# Patient Record
Sex: FEMALE | Race: WHITE
[De-identification: names, ages, dates, MRNs, and addresses within clinical notes are randomized per-mention and may not be internally consistent; named-entity substitution may affect disease eponyms.]

---

## 2022-03-25 ENCOUNTER — HOSPITAL ENCOUNTER (OUTPATIENT)
Dept: HOSPITAL 47 - RADMAMWWP | Age: 52
Discharge: HOME | End: 2022-03-25
Attending: OBSTETRICS & GYNECOLOGY
Payer: COMMERCIAL

## 2022-03-25 DIAGNOSIS — Z12.31: Primary | ICD-10-CM

## 2022-03-25 DIAGNOSIS — Z78.0: ICD-10-CM

## 2022-03-25 PROCEDURE — 77063 BREAST TOMOSYNTHESIS BI: CPT

## 2022-03-25 PROCEDURE — 77067 SCR MAMMO BI INCL CAD: CPT

## 2022-03-29 NOTE — MM
Reason for exam: screening  (asymptomatic).

Last mammogram was performed 7 years and 9 months ago.



History:

Patient is postmenopausal.

Excisional biopsy of the right breast, August 6, 2007.

Took hormonal contraceptives beginning at age 20.



Physical Findings:

A clinical breast exam by your physician is recommended on an annual basis and 

results should be correlated with mammographic findings.



MG 3D Screening Mammo W/Cad

Bilateral CC and MLO view(s) were taken.

Prior study comparison: June 16, 2014, bilateral MG diagnostic mammo w CAD [facility].

The breast tissue is heterogeneously dense. This may lower the sensitivity of 

mammography.  Grouped calcifications 2-3 o'clock left breast, likely early oil 

cyst formation. 6 month follow up recommended. Posterior upper outer quadrant 

grouped calcifications right breast, magnification view recommended for these new 

calcifications.





ASSESSMENT: Incomplete: need additional imaging evaluation, BI-RAD 0



RECOMMENDATION:

Special view mammogram of the right breast.



[facility] will attempt to contact patient to return for supplemental 

views.

Follow-up diagnostic mammogram of the left breast in 6 months.

## 2022-04-14 ENCOUNTER — HOSPITAL ENCOUNTER (OUTPATIENT)
Dept: HOSPITAL 47 - WWCWWP | Age: 52
End: 2022-04-14
Attending: SURGERY
Payer: COMMERCIAL

## 2022-04-14 ENCOUNTER — HOSPITAL ENCOUNTER (OUTPATIENT)
Dept: HOSPITAL 47 - RADMAMWWP | Age: 52
End: 2022-04-14
Attending: SURGERY
Payer: COMMERCIAL

## 2022-04-14 VITALS
DIASTOLIC BLOOD PRESSURE: 88 MMHG | TEMPERATURE: 98.1 F | HEART RATE: 77 BPM | SYSTOLIC BLOOD PRESSURE: 147 MMHG | RESPIRATION RATE: 16 BRPM

## 2022-04-14 DIAGNOSIS — R92.0: Primary | ICD-10-CM

## 2022-04-14 DIAGNOSIS — Z88.2: ICD-10-CM

## 2022-04-14 DIAGNOSIS — R92.8: Primary | ICD-10-CM

## 2022-04-14 NOTE — P.GSHP
History of Present Illness


H&P Date: 22


Chief Complaint: Abnormal right breast mammogram





     Taryn this 51-year-old white female seen in consultation for Dr. Hale 

regarding a mammographic abnormality in her right breast.  She underwent a 

bilateral mammogram in 2022 which revealed an area of concern in the right

breast, magnification views of that breast were performed and 4622 which 

revealed suspicious group/clustered calcifications in the upper quadrant of the 

right breast.  The patient does not feel anything of concern in either breast.  

She is not complaining of any new lumps masses or nodules of concern.  She has 

not had any recent trauma or infection in the breast. She had a cyst removed 

from her right breast in , which was benign.  Flat complaining of any pain 

in her breast.  Her last mammogram.  This last mammogram was in .





Caffiene: 1 cup/day


nicotine: none


chocolate: occasional


BCP: 10 years until 34


hormones: testosterone cream twice a week since 





Family history:


none





hormonal History:


menarche: 13


, breast fed: yes, age at first birth: 23


menopause: 47








Surgical history:


breast biopsy


Pilonidal cyst


Tubal ligation





Medical history:


lichen sclerosis/ seeing dermatologist soon in pubic area





Social history:


Nicotine: Negative


Alcohol: Negative


Drugs: Negative











- Constitutional


Constitutional: Reports sweats, Denies chills, Denies fever





- EENT


Eyes: denies blurred vision, denies pain


Ears: deny: decreased hearing, tinnitus


Ears, nose, mouth and throat: Denies headache, Denies sore throat





- Breasts


Breasts: bilateral: as per HPI





- Cardiovascular


Cardiovascular: Denies chest pain, Denies shortness of breath





- Respiratory


Respiratory: Denies cough, Denies 7





- Gastrointestinal


Gastrointestinal: Denies abdominal pain, Denies diarrhea, Denies nausea, Denies 

vomiting





- Genitourinary (Female)


Genitourinary: Denies dysuria, Denies hematuria





- Menstruation


Menstruation: Reports postmenopausal





- Musculoskeletal


Musculoskeletal: Denies myalgias





- Integumentary


Integumentary: Reports as per HPI





- Neurological


Neurological: Denies numbness, Denies weakness





- Psychiatric


Psychiatric: Denies anxiety, Denies depression





- Endocrine


Endocrine: Denies fatigue, Denies weight change





- Hematologic/Lymphatic


Comment: 





none





- Allergic/Immunologic


Allergic/Immunologic: Reports seasonal allergies





Medications and Allergies


                                Home Medications











 Medication  Instructions  Recorded  Confirmed  Type


 


Calcium Carbonate/Vitamin D3 1 tablet RC DAILY 22 History





[Calcium 600-Vit D3 12.5 Mcg (500    





Iu)]    


 


Testosterone [Testosterone 1.62% 1.25 gm TRANSDERM WEEKLY 22 

History





(1250 mg)]    








                                    Allergies











Allergy/AdvReac Type Severity Reaction Status Date / Time


 


sulfamethoxazole AdvReac  Rash/Hives Verified 22 07:14














Surgical - Exam





- General


no distress





- Eyes


normal ocular movement





- Neck


trachea midline





- Respiratory


normal respiratory effort





- Cardiovascular


Rhythm: regular


Heart Sounds: normal: S1





- Abdomen


Abdomen: soft





- Integumentary





lichen sclerosis pigment loss upper right chest





- Neurologic


no disoriented, no combative





- Musculoskeletal


normal gait, normal posture





- Psychiatric


oriented to time, oriented to person, oriented to place, speech is normal, m

timothy intact





Breast Exam:


BRA: 36B


inspection: Slightly irritated hair follicles under right axilla, bilateral 

grade 2 ptosis


Palpation:


Right breast: Well-healed scar from prior biopsy no dominant masses or nodules 

of concern on multiple positional exam fibrocystic changes


Right axilla: Slightly irritated hair follicles noted adenopathy of concern


Left axilla: No adenopathy of concern


Left breast: Multi-positional exam fibrocystic changes no dominant masses or 

nodules of concern





Results





Mammogram reviewed in detail with Dr. Turner





Assessment and Plan


Assessment: 





Impression:


Radiographic abnormality right breast microcalcifications upper outer quadrant





Plan:


The location of the microcalcifications on the ML view is questioned therefore a

mag ML view is recommended following this most likely stereotactic core biopsy





Risk and benefits of the procedure discussed with the patient.  She understands 

and wishes to proceed.  Risks include but are not limited to bleeding, 

infection, reaction to the anesthetic.  Additionally the risk that the area of 

concern is not adequately visualized which could result in aborting the 

procedure.  The patient understands and this will be done in the near future.  

Alternatives such as watchful waiting or removal in the operating room discussed

but not recommended.





CC: Dr. Hale

## 2022-04-14 NOTE — MM
EXAMINATION TYPE: MG discontinued stereo core RT

 

DATE OF EXAM: 4/14/2022

 

COMPARISON: 3/25/2022, 4/6/2022

 

CLINICAL HISTORY: 51-year-old female R92.8, abnormal mammogram

 

TECHNIQUE: Canceled stereotactic guided core biopsy of the right breast.  

 

FINDINGS:

The patient's initial images were reviewed. Given the far lateral position of the calcifications, a l
ateral approach was desired by the calcifications were not well depicted on the MLO and lateral views
. Because of this, repeat ML and mag ML views were obtained. The calcifications are identified at the
 9:00 position as discontinuous thin rim calcification suggesting early oil cyst. Six-month follow-up
 can be performed to confirm. Biopsy is being canceled. Findings and impression are discussed with yue elizabeth patient.

 

 

 

IMPRESSION: Canceled stereotactic core needle biopsy of the right breast. Additional views suggest ea
rly or cystic calcifications at the 9:00 position.

 

BI-RADS 3, probably benign

 

RECOMMENDATION:

1. Six-month follow-up diagnostic right breast mammogram.

2. Patient should continue monthly self breast exam.

3. This exam should not preclude additional follow-up of suspicious palpable abnormalities.

## 2022-05-23 ENCOUNTER — APPOINTMENT (OUTPATIENT)
Dept: URBAN - METROPOLITAN AREA CLINIC 234 | Age: 52
Setting detail: DERMATOLOGY
End: 2022-05-23

## 2022-05-23 VITALS — HEIGHT: 65 IN | WEIGHT: 145 LBS

## 2022-05-23 DIAGNOSIS — D22 MELANOCYTIC NEVI: ICD-10-CM

## 2022-05-23 DIAGNOSIS — L80 VITILIGO: ICD-10-CM

## 2022-05-23 PROBLEM — D22.0 MELANOCYTIC NEVI OF LIP: Status: ACTIVE | Noted: 2022-05-23

## 2022-05-23 PROCEDURE — OTHER MIPS QUALITY: OTHER

## 2022-05-23 PROCEDURE — OTHER PRESCRIPTION: OTHER

## 2022-05-23 PROCEDURE — 99203 OFFICE O/P NEW LOW 30 MIN: CPT

## 2022-05-23 PROCEDURE — OTHER COUNSELING: OTHER

## 2022-05-23 RX ORDER — TACROLIMUS 1 MG/G
OINTMENT TOPICAL
Qty: 60 | Refills: 3 | Status: ERX | COMMUNITY
Start: 2022-05-23

## 2022-05-23 ASSESSMENT — LOCATION ZONE DERM
LOCATION ZONE: LIP
LOCATION ZONE: TRUNK
LOCATION ZONE: ARM

## 2022-05-23 ASSESSMENT — LOCATION DETAILED DESCRIPTION DERM
LOCATION DETAILED: RIGHT LATERAL SUPERIOR CHEST
LOCATION DETAILED: RIGHT LOWER CUTANEOUS LIP
LOCATION DETAILED: RIGHT ANTERIOR SHOULDER
LOCATION DETAILED: RIGHT CLAVICULAR SKIN

## 2022-05-23 ASSESSMENT — LOCATION SIMPLE DESCRIPTION DERM
LOCATION SIMPLE: CHEST
LOCATION SIMPLE: RIGHT CLAVICULAR SKIN
LOCATION SIMPLE: RIGHT LIP
LOCATION SIMPLE: RIGHT SHOULDER

## 2022-05-23 NOTE — HPI: OTHER
Condition:: Change in skin pigment
Please Describe Your Condition:: is a new patient who is being seen for a chief complaint of change in skin pigment on chest

## 2022-10-19 ENCOUNTER — HOSPITAL ENCOUNTER (OUTPATIENT)
Dept: HOSPITAL 47 - RADMAMWWP | Age: 52
Discharge: HOME | End: 2022-10-19
Attending: SURGERY
Payer: COMMERCIAL

## 2022-10-19 DIAGNOSIS — Z78.0: ICD-10-CM

## 2022-10-19 DIAGNOSIS — R92.8: Primary | ICD-10-CM

## 2022-10-19 PROCEDURE — 77061 BREAST TOMOSYNTHESIS UNI: CPT

## 2022-10-19 PROCEDURE — 77065 DX MAMMO INCL CAD UNI: CPT

## 2022-10-19 NOTE — MM
Reason for Exam: Follow-up at short interval from prior study. 

Last screening mammogram was performed 7 month(s) ago.





Patient History: 

Menarche at age 13. First Full-Term Pregnancy at age 23. Postmenopausal. Patient has history of

breast feeding. Hormonal Contraceptives, starting at age 20. 08/06/2007, Excisional Biopsy on the

Right side. 4/14/2022, MG discontinued stereo core RT on the right side. 





Risk Values: 

Vibha 5 year model risk: 1.1%.

NCI Lifetime model risk: 8.2%.





Prior Study Comparison: 

6/16/2014 Bilateral Diagnostic Mammogram, [facility]. 3/25/2022 Bilateral Screening Mammogram, [facility].

4/6/2022 Right Diagnostic Mammogram, [facility]. 





Tissue Density: 

Right: The breast tissue is heterogeneously dense. This may lower the sensitivity of mammography.





Findings: 

Analyzed By CAD. 

No new suspicious mass in the right breast. Stable thin rim calcifications within the posterior

right breast representing early oil cyst. No new worrisome cluster of microcalcifications. 





Overall Assessment: Benign, BI-RAD 2





Management: 

Screening Mammogram of both breasts in 6 months.

A clinical breast exam by your physician is recommended on an annual basis and results should be

correlated with mammographic findings.  This exam should not preclude additional follow-up of

suspicious palpable abnormalities.  Results were given to the patient verbally at the time of exam.



Electronically signed and approved by: Gurvinder Stauffer D.O.

## 2022-10-19 NOTE — MM
Reason for Exam: Follow-up at short interval from prior study. 

Last screening mammogram was performed 7 month(s) ago.





Patient History: 

Menarche at age 13. First Full-Term Pregnancy at age 23. Postmenopausal. Patient has history of

breast feeding. Hormonal Contraceptives, starting at age 20. 08/06/2007, Excisional Biopsy on the

Right side. 4/14/2022, MG discontinued stereo core RT on the right side. 





Risk Values: 

Vibha 5 year model risk: 1.1%.

NCI Lifetime model risk: 8.2%.





Prior Study Comparison: 

6/16/2014 Bilateral Diagnostic Mammogram, [facility]. 3/25/2022 Bilateral Screening Mammogram, [facility].

4/6/2022 Right Diagnostic Mammogram, [facility]. 





Tissue Density: 

Right: The breast tissue is heterogeneously dense. This may lower the sensitivity of mammography.





Findings: 

Analyzed By CAD. 

No new suspicious mass in the right breast. Stable thin rim calcifications within the posterior

right breast representing early oil cyst. No new worrisome cluster of microcalcifications. 





Overall Assessment: Benign, BI-RAD 2





Management: 

Screening Mammogram of both breasts in 6 months.

A clinical breast exam by your physician is recommended on an annual basis and results should be

correlated with mammographic findings.  This exam should not preclude additional follow-up of

suspicious palpable abnormalities.  Results were given to the patient verbally at the time of exam.



Electronically signed and approved by: Gurvinder Stauffer D.O.

## 2023-04-19 ENCOUNTER — HOSPITAL ENCOUNTER (OUTPATIENT)
Dept: HOSPITAL 47 - RADMAMWWP | Age: 53
Discharge: HOME | End: 2023-04-19
Attending: OBSTETRICS & GYNECOLOGY
Payer: COMMERCIAL

## 2023-04-19 DIAGNOSIS — Z12.31: Primary | ICD-10-CM

## 2023-04-19 DIAGNOSIS — Z78.0: ICD-10-CM

## 2023-04-19 PROCEDURE — 77063 BREAST TOMOSYNTHESIS BI: CPT

## 2023-04-19 PROCEDURE — 77067 SCR MAMMO BI INCL CAD: CPT

## 2023-04-20 NOTE — MM
Reason for Exam: Screening  (asymptomatic). 

Last mammogram was performed 1 year(s) and 1 month(s) ago. 





Patient History: 

Menarche at age 13. First Full-Term Pregnancy at age 23. Postmenopausal. Patient has history of

breast feeding. Hormonal Contraceptives, starting at age 20. 08/06/2007, Excisional Biopsy on the

Right side. 4/14/2022, MG discontinued stereo core RT on the right side. 





Risk Values: 

Vibha 5 year model risk: 1.1%.

NCI Lifetime model risk: 8.2%.





Prior Study Comparison: 

3/25/2022 Bilateral Screening Mammogram, [facility]. 4/6/2022 Right Diagnostic Mammogram, [facility]. 10/19/2022

Right MG 3D diag mammo w/cad RT, [facility]. 





Tissue Density: 

The breast tissue is heterogeneously dense. This may lower the sensitivity of mammography.





Findings: 

Analyzed By CAD. 

There is no suspicious group of microcalcifications or new suspicious mass in either breast.

Bilateral benign-appearing calcifications. 





Overall Assessment: Benign, BI-RAD 2





Management: 

Screening Mammogram of both breasts in 1 year.

A clinical breast exam by your physician is recommended on an annual basis and results should be

correlated with mammographic findings.



Electronically signed and approved by: Gurvinder Stauffer D.O.